# Patient Record
Sex: MALE | Race: BLACK OR AFRICAN AMERICAN | NOT HISPANIC OR LATINO | Employment: FULL TIME | URBAN - METROPOLITAN AREA
[De-identification: names, ages, dates, MRNs, and addresses within clinical notes are randomized per-mention and may not be internally consistent; named-entity substitution may affect disease eponyms.]

---

## 2023-04-28 ENCOUNTER — OFFICE VISIT (OUTPATIENT)
Dept: FAMILY MEDICINE | Facility: CLINIC | Age: 38
End: 2023-04-28
Payer: COMMERCIAL

## 2023-04-28 VITALS
BODY MASS INDEX: 39.57 KG/M2 | WEIGHT: 261.1 LBS | DIASTOLIC BLOOD PRESSURE: 83 MMHG | HEART RATE: 78 BPM | OXYGEN SATURATION: 99 % | HEIGHT: 68 IN | RESPIRATION RATE: 20 BRPM | SYSTOLIC BLOOD PRESSURE: 129 MMHG | TEMPERATURE: 98.2 F

## 2023-04-28 DIAGNOSIS — K21.00 GASTROESOPHAGEAL REFLUX DISEASE WITH ESOPHAGITIS, UNSPECIFIED WHETHER HEMORRHAGE: ICD-10-CM

## 2023-04-28 DIAGNOSIS — R07.89 ATYPICAL CHEST PAIN: ICD-10-CM

## 2023-04-28 DIAGNOSIS — Z00.00 ROUTINE HISTORY AND PHYSICAL EXAMINATION OF ADULT: Primary | ICD-10-CM

## 2023-04-28 DIAGNOSIS — R10.13 ABDOMINAL PAIN, EPIGASTRIC: ICD-10-CM

## 2023-04-28 DIAGNOSIS — R06.83 SNORING: ICD-10-CM

## 2023-04-28 DIAGNOSIS — K29.50 CHRONIC GASTRITIS WITHOUT BLEEDING, UNSPECIFIED GASTRITIS TYPE: ICD-10-CM

## 2023-04-28 DIAGNOSIS — Z13.1 SCREENING FOR DIABETES MELLITUS: ICD-10-CM

## 2023-04-28 DIAGNOSIS — Z13.220 LIPID SCREENING: ICD-10-CM

## 2023-04-28 LAB
ALBUMIN SERPL BCG-MCNC: 4.7 G/DL (ref 3.5–5.2)
ALP SERPL-CCNC: 74 U/L (ref 40–129)
ALT SERPL W P-5'-P-CCNC: 38 U/L (ref 10–50)
ANION GAP SERPL CALCULATED.3IONS-SCNC: 11 MMOL/L (ref 7–15)
AST SERPL W P-5'-P-CCNC: 30 U/L (ref 10–50)
BILIRUB SERPL-MCNC: 0.3 MG/DL
BUN SERPL-MCNC: 9 MG/DL (ref 6–20)
CALCIUM SERPL-MCNC: 9.5 MG/DL (ref 8.6–10)
CHLORIDE SERPL-SCNC: 105 MMOL/L (ref 98–107)
CHOLEST SERPL-MCNC: 166 MG/DL
CREAT SERPL-MCNC: 1.02 MG/DL (ref 0.67–1.17)
DEPRECATED HCO3 PLAS-SCNC: 23 MMOL/L (ref 22–29)
ERYTHROCYTE [DISTWIDTH] IN BLOOD BY AUTOMATED COUNT: 13 % (ref 10–15)
GFR SERPL CREATININE-BSD FRML MDRD: >90 ML/MIN/1.73M2
GLUCOSE SERPL-MCNC: 105 MG/DL (ref 70–99)
HBA1C MFR BLD: 5.4 % (ref 0–5.6)
HCT VFR BLD AUTO: 47.1 % (ref 40–53)
HDLC SERPL-MCNC: 37 MG/DL
HGB BLD-MCNC: 16.5 G/DL (ref 13.3–17.7)
LDLC SERPL CALC-MCNC: 96 MG/DL
LIPASE SERPL-CCNC: 44 U/L (ref 13–60)
MCH RBC QN AUTO: 28.2 PG (ref 26.5–33)
MCHC RBC AUTO-ENTMCNC: 35 G/DL (ref 31.5–36.5)
MCV RBC AUTO: 80 FL (ref 78–100)
NONHDLC SERPL-MCNC: 129 MG/DL
PLATELET # BLD AUTO: 207 10E3/UL (ref 150–450)
POTASSIUM SERPL-SCNC: 4 MMOL/L (ref 3.4–5.3)
PROT SERPL-MCNC: 7.6 G/DL (ref 6.4–8.3)
RBC # BLD AUTO: 5.86 10E6/UL (ref 4.4–5.9)
SODIUM SERPL-SCNC: 139 MMOL/L (ref 136–145)
TRIGL SERPL-MCNC: 164 MG/DL
TROPONIN T SERPL HS-MCNC: 8 NG/L
WBC # BLD AUTO: 5.8 10E3/UL (ref 4–11)

## 2023-04-28 PROCEDURE — 83036 HEMOGLOBIN GLYCOSYLATED A1C: CPT | Performed by: INTERNAL MEDICINE

## 2023-04-28 PROCEDURE — 36415 COLL VENOUS BLD VENIPUNCTURE: CPT | Performed by: INTERNAL MEDICINE

## 2023-04-28 PROCEDURE — 80053 COMPREHEN METABOLIC PANEL: CPT | Performed by: INTERNAL MEDICINE

## 2023-04-28 PROCEDURE — 99385 PREV VISIT NEW AGE 18-39: CPT | Performed by: INTERNAL MEDICINE

## 2023-04-28 PROCEDURE — 93000 ELECTROCARDIOGRAM COMPLETE: CPT | Performed by: INTERNAL MEDICINE

## 2023-04-28 PROCEDURE — 83690 ASSAY OF LIPASE: CPT | Performed by: INTERNAL MEDICINE

## 2023-04-28 PROCEDURE — 85027 COMPLETE CBC AUTOMATED: CPT | Performed by: INTERNAL MEDICINE

## 2023-04-28 PROCEDURE — 80061 LIPID PANEL: CPT | Performed by: INTERNAL MEDICINE

## 2023-04-28 PROCEDURE — 99214 OFFICE O/P EST MOD 30 MIN: CPT | Mod: 25 | Performed by: INTERNAL MEDICINE

## 2023-04-28 PROCEDURE — 84484 ASSAY OF TROPONIN QUANT: CPT | Performed by: INTERNAL MEDICINE

## 2023-04-28 RX ORDER — PANTOPRAZOLE SODIUM 40 MG/1
40 TABLET, DELAYED RELEASE ORAL DAILY
Qty: 30 TABLET | Refills: 1 | Status: SHIPPED | OUTPATIENT
Start: 2023-04-28 | End: 2023-07-05

## 2023-04-28 ASSESSMENT — ENCOUNTER SYMPTOMS: ABDOMINAL PAIN: 1

## 2023-04-28 ASSESSMENT — PAIN SCALES - GENERAL: PAINLEVEL: NO PAIN (0)

## 2023-04-28 NOTE — PROGRESS NOTES
SUBJECTIVE:   CC: Rory is an 37 year old who presents for preventative health visit.     Patient has been advised of split billing requirements and indicates understanding: Yes  Healthy Habits:     Getting at least 3 servings of Calcium per day:  Yes    Bi-annual eye exam:  NO    Dental care twice a year:  NO    Sleep apnea or symptoms of sleep apnea:  None    Diet:  Regular (no restrictions)    Frequency of exercise:  1 day/week    Duration of exercise:  30-45 minutes    Taking medications regularly:  Yes    Medication side effects:  None    PHQ-2 Total Score: 0    Additional concerns today:  Yes    Ability to successfully perform activities of daily living: Yes, no assistance needed  Home safety:  none identified   Hearing impairment: NONE       HAD MALARIA BEFORE HE CAME TO [facility] 10 MONTHS    UTD ON VACCINES    ABDOMINAL PAIN/ CHEST PAIN, CRAMPS  LIKE SX'S NOT RELATED TO EXERTION, NO WT LIFTING,     NO N,V,D    HAS HEART BURN    Today's PHQ-2 Score:       4/28/2023    12:31 PM   PHQ-2 ( 1999 [employer])   Q1: Little interest or pleasure in doing things 0   Q2: Feeling down, depressed or hopeless 0   PHQ-2 Score 0   Q1: Little interest or pleasure in doing things Not at all    Not at all   Q2: Feeling down, depressed or hopeless Not at all    Not at all   PHQ-2 Score 0    0           Social History     Tobacco Use     Smoking status: Never     Smokeless tobacco: Never   Vaping Use     Vaping status: Never Used   Substance Use Topics     Alcohol use: Never             4/28/2023    12:31 PM   Alcohol Use   Prescreen: >3 drinks/day or >7 drinks/week? No          View : No data to display.                Last PSA: No results found for: PSA    Reviewed orders with patient. Reviewed health maintenance and updated orders accordingly - Yes  Lab work is in process  Labs reviewed in EPIC  BP Readings from Last 3 Encounters:   04/28/23 129/83    Wt Readings from Last 3 Encounters:   04/28/23 118.4 kg (261 lb 1.6 oz)                   Patient Active Problem List   Diagnosis     Snoring     History reviewed. No pertinent surgical history.    Social History     Tobacco Use     Smoking status: Never     Smokeless tobacco: Never   Vaping Use     Vaping status: Never Used   Substance Use Topics     Alcohol use: Never     History reviewed. No pertinent family history.      Current Outpatient Medications   Medication Sig Dispense Refill     pantoprazole (PROTONIX) 40 MG EC tablet Take 1 tablet (40 mg) by mouth daily 30 tablet 1     No Known Allergies  Recent Labs   Lab Test 04/28/23  1405   A1C 5.4        Reviewed and updated as needed this visit by clinical staff   Tobacco  Allergies  Meds  Problems  Med Hx  Surg Hx  Fam Hx          Reviewed and updated as needed this visit by Provider   Tobacco  Allergies   Problems  Med Hx  Surg Hx  Fam Hx         History reviewed. No pertinent past medical history.   History reviewed. No pertinent surgical history.    Review of Systems  CONSTITUTIONAL: NEGATIVE for fever, chills, change in weight  INTEGUMENTARY/SKIN: NEGATIVE for worrisome rashes, moles or lesions  EYES: NEGATIVE for vision changes or irritation  ENT: NEGATIVE for ear, mouth and throat problems, OCCASIONAL SNORING  RESP: NEGATIVE for significant cough or SOB  CV: NEGATIVE for chest pain, palpitations or peripheral edema  GI: NEGATIVE for nausea, abdominal pain, heartburn, or change in bowel habits   male: negative for dysuria, hematuria, decreased urinary stream, erectile dysfunction, urethral discharge  MUSCULOSKELETAL: NEGATIVE for significant arthralgias or myalgia, SOMETIMES LOW BACK PAINS  NEURO: NEGATIVE for weakness, dizziness or paresthesias  ENDOCRINE: NEGATIVE for temperature intolerance, skin/hair changes  PSYCHIATRIC: NEGATIVE for changes in mood or affect    OBJECTIVE:   /83 (BP Location: Left arm, Patient Position: Sitting, Cuff Size: Adult Large)   Pulse 78   Temp 98.2  F (36.8  C) (Oral)   Resp 20    "Ht 1.73 m (5' 8.11\")   Wt 118.4 kg (261 lb 1.6 oz)   SpO2 99%   BMI 39.57 kg/m      Physical Exam  GENERAL: healthy, alert and no distress  EYES: Eyes grossly normal to inspection, PERRL and conjunctivae and sclerae normal  HENT: ear canals and TM's normal, nose and mouth without ulcers or lesions  NECK: no adenopathy, no asymmetry, masses, or scars and thyroid normal to palpation  RESP: lungs clear to auscultation - no rales, rhonchi or wheezes  CV: regular rate and rhythm, normal S1 S2, no S3 or S4, no murmur, click or rub, no peripheral edema and peripheral pulses strong  ABDOMEN: soft, nontender, no hepatosplenomegaly, no masses and bowel sounds normal  MS: no gross musculoskeletal defects noted, no edema  SKIN: no suspicious lesions or rashes  NEURO: Normal strength and tone, mentation intact and speech normal  PSYCH: mentation appears normal, affect normal/bright    Diagnostic Test Results:  Labs reviewed in Epic    ASSESSMENT/PLAN:   Rory was seen today for physical.    Diagnoses and all orders for this visit:    Routine history and physical examination of adult  -     CBC with platelets  -     Comprehensive metabolic panel (BMP + Alb, Alk Phos, ALT, AST, Total. Bili, TP)    Snoring  -     Adult Sleep Eval & Management  Referral; Future    Lipid screening  -     Lipid panel reflex to direct LDL Fasting    Screening for diabetes mellitus  -     Hemoglobin A1c    Gastroesophageal reflux disease with esophagitis, unspecified whether hemorrhage  -     Helicobacter pylori Antigen Stool; Future  -     pantoprazole (PROTONIX) 40 MG EC tablet; Take 1 tablet (40 mg) by mouth daily  -     Helicobacter pylori Antigen Stool    Atypical chest pain  -     EKG 12-lead complete w/read - Clinics  -     Troponin T, High Sensitivity    Abdominal pain, epigastric  -     Lipase  -     US Abdomen Limited; Future    Chronic gastritis without bleeding, unspecified gastritis type  -     pantoprazole (PROTONIX) 40 MG EC " tablet; Take 1 tablet (40 mg) by mouth daily    Patient complaining of abdominal pain and chest pain.  Possible GERD related symptoms, he does have history of heartburn.  Denies any melena or hematochezia.  Denies any breathing issues or pleuritic chest pain.  Denies any chest pain with exertion.  We will do an EKG troponin level start him on PPI therapy for 4 weeks and follow-up in 4 weeks.  Consider stress test if his symptoms continue.  His abdominal exam is benign.  Currently has no chest pain symptoms.  Check some screening labs.  He reports he received all his vaccines when he applied to the [facility] in [facility].  Denies any history of TB.  He was treated for malaria with atovaquone prior to his trip to the [place].  Describes some snoring not sure he has any apneas.  We will refer to sleep medicine which she is interested.  Blood pressure slightly elevated.  Will monitor.    Patient has been advised of split billing requirements and indicates understanding: Yes      COUNSELING:   Reviewed preventive health counseling, as reflected in patient instructions       Regular exercise       Healthy diet/nutrition       Vision screening       Hearing screening       Alcohol Use        Family planning       Safe sex practices/STD prevention       Consider Hep C screening for all patients one time for ages 18-79 years       HIV screeninx in teen years, 1x in adult years, and at intervals if high risk       Advance Care Planning        He reports that he has never smoked. He has never used smokeless tobacco.            Aron Ramos MD  [facility]

## 2023-04-29 NOTE — RESULT ENCOUNTER NOTE
Please notify patient of the following lab results,    Protus, reviewed some of your labs and EKG,    EKG is normal.    CBC shows normal blood counts including normal hemoglobin.  There is no anemia and normal platelet count.    Test for diabetes called HbA1c is normal at 5.4, you do not have prediabetes or diabetes.    Please reassure with the lab results,     Rest of labs are pending  Dr Ramos

## 2023-04-29 NOTE — RESULT ENCOUNTER NOTE
Danis Valadez, it was very nice meeting you in the clinic, reviewed your labs,    Chemistry shows normal electrolytes, normal kidney function test, normal liver enzymes, normal calcium level ,.    Troponin  is cardiac enzyme was normal which is reassuring, suggest there is no cardiac muscle injury to account for atypical chest pain.    Pancreatic test called lipase is normal,     test for diabetes called HbA1c is normal, you do not have prediabetes or diabetes, HbA1c is 5.4,    Lipid panel shows normal LDL [bad cholesterol], HDL( HDL Good cholesterol),  slightly low at 37; goal greater than 50; can increase HDL by increase exercise activities and weight loss as tolerated, triglycerides slightly elevated,    Continue with healthy diet and brisk walking 30 minutes 5 times a week,      The ASCVD Risk score (Ro DK, et al., 2019) failed to calculate for the following reasons:    The 2019 ASCVD risk score is only valid for ages 40 to 79     CBC shows normal blood counts including normal white blood cell count, normal hemoglobin and hematocrit, no anemia and normal platelet count,    Any further questions please let me know    Dr Ramos

## 2023-05-01 ENCOUNTER — TELEPHONE (OUTPATIENT)
Dept: FAMILY MEDICINE | Facility: CLINIC | Age: 38
End: 2023-05-01
Payer: COMMERCIAL

## 2023-05-01 NOTE — TELEPHONE ENCOUNTER
Patient Contact    Attempt # 1    Was call answered?  No.  Left message on voicemail with information to call triage back.    On callback - please review Dr. Ramos's result note with patient (2 separate result notes available from 4/28/23)     Geovanny Alexis RN  [facility]

## 2023-05-01 NOTE — TELEPHONE ENCOUNTER
----- Message from Aron Ramos MD sent at 4/29/2023  8:49 AM CDT -----  Hi Martinus, it was very nice meeting you in the clinic, reviewed your labs,    Chemistry shows normal electrolytes, normal kidney function test, normal liver enzymes, normal calcium level ,.    Troponin  is cardiac enzyme was normal which is reassuring, suggest there is no cardiac muscle injury to account for atypical chest pain.    Pancreatic test called lipase is normal,     test for diabetes called HbA1c is normal, you do not have prediabetes or diabetes, HbA1c is 5.4,    Lipid panel shows normal LDL [bad cholesterol], HDL( HDL Good cholesterol),  slightly low at 37; goal greater than 50; can increase HDL by increase exercise activities and weight loss as tolerated, triglycerides slightly elevated,    Continue with healthy diet and brisk walking 30 minutes 5 times a week,      The ASCVD Risk score (Ro DK, et al., 2019) failed to calculate for the following reasons:    The 2019 ASCVD risk score is only valid for ages 40 to 79     CBC shows normal blood counts including normal white blood cell count, normal hemoglobin and hematocrit, no anemia and normal platelet count,    Any further questions please let me know    Dr Ramos

## 2023-05-02 NOTE — TELEPHONE ENCOUNTER
Called patient regarding PCP message below. Patient verbalized understanding.     Angelika Keating RN on 5/2/2023 at 9:57 AM

## 2023-05-04 ENCOUNTER — TELEPHONE (OUTPATIENT)
Dept: FAMILY MEDICINE | Facility: CLINIC | Age: 38
End: 2023-05-04
Payer: COMMERCIAL

## 2023-05-04 NOTE — TELEPHONE ENCOUNTER
Called pt, spoke about cholesterol levels and reviewed diet and exercise recommendations for high cholesterol. Pt receptive to recommendation.     FRANCISCO MTZ RN on 5/4/2023 at 1:58 PM

## 2023-05-04 NOTE — TELEPHONE ENCOUNTER
Test Results    Contacts       Type Contact Phone/Fax    05/04/2023 09:17 AM CDT Phone (Incoming) Rory Melendrez N (Self) [phone] (H)          Who ordered the test:  Aron Ramos MD    Type of test: Lab    Date of test:  4/28/2023    Where was the test performed:  [facility]    What are your questions/concerns?:  Patient requesting to go over lab results. He has a particular concern regarding cholesterol reading.     Could we send this information to you in [facility] or would you prefer to receive a phone call?:   Patient would prefer a phone call   Okay to leave a detailed message?: Yes at Home number on file [phone] (home)

## 2023-05-19 ENCOUNTER — OFFICE VISIT (OUTPATIENT)
Dept: FAMILY MEDICINE | Facility: CLINIC | Age: 38
End: 2023-05-19
Payer: COMMERCIAL

## 2023-05-19 VITALS
WEIGHT: 257.9 LBS | HEIGHT: 68 IN | OXYGEN SATURATION: 100 % | TEMPERATURE: 98.2 F | SYSTOLIC BLOOD PRESSURE: 125 MMHG | DIASTOLIC BLOOD PRESSURE: 78 MMHG | BODY MASS INDEX: 39.09 KG/M2 | HEART RATE: 77 BPM | RESPIRATION RATE: 22 BRPM

## 2023-05-19 DIAGNOSIS — R07.9 CHEST PAIN, UNSPECIFIED TYPE: ICD-10-CM

## 2023-05-19 DIAGNOSIS — R10.84 ABDOMINAL PAIN, GENERALIZED: Primary | ICD-10-CM

## 2023-05-19 DIAGNOSIS — R06.83 SNORING: ICD-10-CM

## 2023-05-19 DIAGNOSIS — E66.812 CLASS 2 OBESITY WITHOUT SERIOUS COMORBIDITY WITH BODY MASS INDEX (BMI) OF 39.0 TO 39.9 IN ADULT, UNSPECIFIED OBESITY TYPE: ICD-10-CM

## 2023-05-19 PROCEDURE — 99214 OFFICE O/P EST MOD 30 MIN: CPT | Performed by: INTERNAL MEDICINE

## 2023-05-19 PROCEDURE — 87338 HPYLORI STOOL AG IA: CPT | Performed by: INTERNAL MEDICINE

## 2023-05-19 ASSESSMENT — PAIN SCALES - GENERAL: PAINLEVEL: MODERATE PAIN (5)

## 2023-05-19 NOTE — PROGRESS NOTES
"  Assessment & Plan   Problem List Items Addressed This Visit        Respiratory    Snoring   Other Visit Diagnoses     Abdominal pain, generalized    -  Primary    Relevant Orders    Cryptosporidium/Giardia Immunoassay    Adult GI  Referral - Consult Only    US Abdomen Complete    Chest pain, unspecified type        Relevant Orders    Exercise Stress Test - Adult    Class 2 obesity without serious comorbidity with body mass index (BMI) of 39.0 to 39.9 in adult, unspecified obesity type               I am not sure about the cause of his symptoms- atypical chest pain atypical abdominal symptoms in the periumbilical area.  Describes being a bit gassy.  Advised can take probiotics, we will do an ultrasound of the abdomen and see GI for a consult can check  Giardia he does not have diarrhea.  [place]  He is getting tested today for H. Pylori; he submitted a stool sample.  As for the atypical chest symptoms, we will do a stress exercise test if that is negative probably his symptoms are related to GERD ,continue on pantoprazole, he had some improvement in his chest symptoms with starting a PPI.  Also patient has history of snoring he is not aware of any apnea or feeling tired or sleepy during the day.  Continue closely monitor.  Repeat blood pressure was within normal.  Strongly encouraged on exercise and weight loss at this point.We may refer him to [facility] To discuss  obesity medications including GLP-1 agonist injections.       BMI:   Estimated body mass index is 39.09 kg/m  as calculated from the following:    Height as of this encounter: 1.73 m (5' 8.11\").    Weight as of this encounter: 117 kg (257 lb 14.4 oz).   Weight management plan: Discussed healthy diet and exercise guidelines    CONSULTATION/REFERRAL to GI  Work on weight loss  Regular exercise  See Patient Instructions    Aron Ramos MD  [facility] KORTNEY Valadez is a 37 year old, presenting for " "the following health issues:  Follow Up (3 Week Follow up, having chest and abdomen pain,5 out of 10 pain score)    History of Present Illness       Hyperlipidemia:  He presents for follow up of hyperlipidemia.  He is not taking medication to lower cholesterol. He is not having myalgia or other side effects to statin medications.    He eats 0-1 servings of fruits and vegetables daily.He consumes 2 sweetened beverage(s) daily.He exercises with enough effort to increase his heart rate 9 or less minutes per day.  He exercises with enough effort to increase his heart rate 3 or less days per week.   He is taking medications regularly.     Has been on pantoprazole had showed some relief is still have some chest pain and abdominal pain.  His lab work-up was nonrevealing  except for slightly low HDL lipase was normal.  He does not exercise.  Repeat blood pressure was normal 125/78.  Patient does have history of snoring but not loud he does not feel sleepy during the day and he wakes up refreshed.      No diarrhea or constipation.  He feels heartburn all around the chest, the pantoprazole did help with that.  Has pain in the periumbilical area he feels gassy at times.  No blood in his stool regular bowel movements.      No family history of colon cancer.  No blood in the stools.  No diarrhea or constipation.  No postprandial pain.  No particular food triggers the pain pain is intermittent pain can be up to 5 out of 10.  Feels like stabbing pain or stiff at times.    Review of Systems   Constitutional, HEENT, cardiovascular, pulmonary, gi and gu systems are negative, except as otherwise noted.      Objective    /78   Pulse 77   Temp 98.2  F (36.8  C) (Oral)   Resp 22   Ht 1.73 m (5' 8.11\")   Wt 117 kg (257 lb 14.4 oz)   SpO2 100%   BMI 39.09 kg/m    Body mass index is 39.09 kg/m .  Physical Exam   GENERAL: healthy, alert and no distress  EYES: Eyes grossly normal to inspection, PERRL and conjunctivae and sclerae " normal  HENT: ear canals and TM's normal, nose and mouth without ulcers or lesions  NECK: no adenopathy, no asymmetry, masses, or scars and thyroid normal to palpation  RESP: lungs clear to auscultation - no rales, rhonchi or wheezes  CV: regular rate and rhythm, normal S1 S2, no S3 or S4, no murmur, click or rub, no peripheral edema and peripheral pulses strong  ABDOMEN: soft, nontender, no hepatosplenomegaly, no masses and bowel sounds normal  MS: no gross musculoskeletal defects noted, no edema  SKIN: no suspicious lesions or rashes  NEURO: Normal strength and tone, mentation intact and speech normal  PSYCH: mentation appears normal, affect normal/bright    Office Visit on 04/28/2023   Component Date Value Ref Range Status     Cholesterol 04/28/2023 166  <200 mg/dL Final     Triglycerides 04/28/2023 164 (H)  <150 mg/dL Final     Direct Measure HDL 04/28/2023 37 (L)  >=40 mg/dL Final     LDL Cholesterol Calculated 04/28/2023 96  <=100 mg/dL Final     Non HDL Cholesterol 04/28/2023 129  <130 mg/dL Final     WBC Count 04/28/2023 5.8  4.0 - 11.0 10e3/uL Final     RBC Count 04/28/2023 5.86  4.40 - 5.90 10e6/uL Final     Hemoglobin 04/28/2023 16.5  13.3 - 17.7 g/dL Final     Hematocrit 04/28/2023 47.1  40.0 - 53.0 % Final     MCV 04/28/2023 80  78 - 100 fL Final     MCH 04/28/2023 28.2  26.5 - 33.0 pg Final     MCHC 04/28/2023 35.0  31.5 - 36.5 g/dL Final     RDW 04/28/2023 13.0  10.0 - 15.0 % Final     Platelet Count 04/28/2023 207  150 - 450 10e3/uL Final     Sodium 04/28/2023 139  136 - 145 mmol/L Final     Potassium 04/28/2023 4.0  3.4 - 5.3 mmol/L Final     Chloride 04/28/2023 105  98 - 107 mmol/L Final     Carbon Dioxide (CO2) 04/28/2023 23  22 - 29 mmol/L Final     Anion Gap 04/28/2023 11  7 - 15 mmol/L Final     Urea Nitrogen 04/28/2023 9.0  6.0 - 20.0 mg/dL Final     Creatinine 04/28/2023 1.02  0.67 - 1.17 mg/dL Final     Calcium 04/28/2023 9.5  8.6 - 10.0 mg/dL Final     Glucose 04/28/2023 105 (H)  70 - 99  mg/dL Final     Alkaline Phosphatase 04/28/2023 74  40 - 129 U/L Final     AST 04/28/2023 30  10 - 50 U/L Final     ALT 04/28/2023 38  10 - 50 U/L Final     Protein Total 04/28/2023 7.6  6.4 - 8.3 g/dL Final     Albumin 04/28/2023 4.7  3.5 - 5.2 g/dL Final     Bilirubin Total 04/28/2023 0.3  <=1.2 mg/dL Final     GFR Estimate 04/28/2023 >90  >60 mL/min/1.73m2 Final    eGFR calculated using 2021 CKD-EPI equation.     Hemoglobin A1C 04/28/2023 5.4  0.0 - 5.6 % Final    Normal <5.7%   Prediabetes 5.7-6.4%    Diabetes 6.5% or higher     Note: Adopted from ADA consensus guidelines.     Troponin T, High Sensitivity 04/28/2023 8  <=22 ng/L Final    Either a High Sensitivity Troponin T baseline (0 hours) value = 100 ng/L, or an increase in High Sensitivity Troponin T = 7 ng/L at 2 hours compared to 0 hours (2-0 hours), suggests myocardial injury, and urgent clinical attention is required.    If the 2-0 hours increase is <7 ng/L, a High Sensitivity Troponin T result above gender-specific reference ranges warrants further evaluation.   Recommendations for further evaluation include correlation with clinical decision-making tool (e.g., HEART), a 3rd High Sensitivity Troponin T test 2 hours after the 2nd (a 20% change from baseline would represent concern), admission for observation, close PCC/cardiology follow-up, or urgent outpatient provocative testing.     Lipase 04/28/2023 44  13 - 60 U/L Final

## 2023-05-21 ENCOUNTER — HEALTH MAINTENANCE LETTER (OUTPATIENT)
Age: 38
End: 2023-05-21

## 2023-05-22 ENCOUNTER — TELEPHONE (OUTPATIENT)
Dept: GASTROENTEROLOGY | Facility: CLINIC | Age: 38
End: 2023-05-22
Payer: COMMERCIAL

## 2023-05-22 LAB — H PYLORI AG STL QL IA: NEGATIVE

## 2023-05-22 NOTE — TELEPHONE ENCOUNTER
Health Call Center    Phone Message    May a detailed message be left on voicemail: yes     Reason for Call: Appointment Intake    Referring Provider Name:   Diagnosis and/or Symptoms: Abdominal pain, generalized [R10.84]    Per triage notes, patient is to be seen as a GI Urgent, but is currently scheduled on 07/18/2023 with Dr. Pallavi Staley. Current appointment is outside requested time frame. Please review for further follow up per scheduling guidelines. Thanks!     Action Taken: Message routed to:  [facility] ([facility] GI    Travel Screening: Not Applicable

## 2023-05-25 ENCOUNTER — ANCILLARY PROCEDURE (OUTPATIENT)
Dept: ULTRASOUND IMAGING | Facility: CLINIC | Age: 38
End: 2023-05-25
Attending: INTERNAL MEDICINE
Payer: COMMERCIAL

## 2023-05-25 DIAGNOSIS — R10.84 ABDOMINAL PAIN, GENERALIZED: ICD-10-CM

## 2023-05-25 PROCEDURE — 76700 US EXAM ABDOM COMPLETE: CPT

## 2023-06-02 NOTE — TELEPHONE ENCOUNTER
Called Pt to help get him scheduled for a sooner appointment. Pt picked up the phone and during Writer's introduction, the phone line got disconnected. Writer tried calling back a few times and it went straight to voicemail.     Writer will send Pt a [employer] message.

## 2023-06-09 NOTE — TELEPHONE ENCOUNTER
Pt called and talked with Pt. Pt is now rescheduled to see Dr. Rick Townsend on 6/13/2023 at 8:40am for an in-person clinic visit.

## 2023-06-09 NOTE — TELEPHONE ENCOUNTER
REFERRAL INFORMATION:  Referring Provider:  Dr. Aron Ramos MD  Referring Clinic:  [facility]  Reason for Visit/Diagnosis: Abdominal pain     FUTURE VISIT INFORMATION:  Appointment Date: 6/13/2023  Appointment Time: 8:40 AM     NOTES STATUS DETAILS   OFFICE NOTE from Referring Provider [facility]:  5/19/23- OV with Dr. Aron Ramos MD for abdominal pain  4/28/23- OV with Dr. Aron Ramos MD for routine physical exam   OFFICE NOTE from Other Specialist N/A    HOSPITAL DISCHARGE SUMMARY/  ED VISITS N/A    OPERATIVE REPORT N/A    MEDICATION LIST Internal         ENDOSCOPY  N/A    COLONOSCOPY N/A    ERCP N/A    EUS N/A      STOOL TESTING N/A    PERTINENT LABS Internal    PATHOLOGY REPORTS (RELATED) N/A    IMAGING (CT, MRI, EGD, MRCP, Small Bowel Follow Through/SBT, MR/CT Enterography) [facility]:   5/25/23- US abdomen complete

## 2023-06-13 ENCOUNTER — PRE VISIT (OUTPATIENT)
Dept: GASTROENTEROLOGY | Facility: CLINIC | Age: 38
End: 2023-06-13

## 2023-06-16 ENCOUNTER — OFFICE VISIT (OUTPATIENT)
Dept: FAMILY MEDICINE | Facility: CLINIC | Age: 38
End: 2023-06-16
Payer: COMMERCIAL

## 2023-06-16 VITALS
DIASTOLIC BLOOD PRESSURE: 74 MMHG | SYSTOLIC BLOOD PRESSURE: 121 MMHG | HEART RATE: 77 BPM | WEIGHT: 258.3 LBS | OXYGEN SATURATION: 98 % | RESPIRATION RATE: 20 BRPM | HEIGHT: 68 IN | TEMPERATURE: 98.2 F | BODY MASS INDEX: 39.15 KG/M2

## 2023-06-16 DIAGNOSIS — R07.89 ATYPICAL CHEST PAIN: ICD-10-CM

## 2023-06-16 DIAGNOSIS — R06.83 SNORING: ICD-10-CM

## 2023-06-16 DIAGNOSIS — E66.9 OBESITY (BMI 35.0-39.9 WITHOUT COMORBIDITY): ICD-10-CM

## 2023-06-16 DIAGNOSIS — R10.9 ABDOMINAL DISCOMFORT: Primary | ICD-10-CM

## 2023-06-16 PROCEDURE — 99213 OFFICE O/P EST LOW 20 MIN: CPT | Mod: 25 | Performed by: INTERNAL MEDICINE

## 2023-06-16 PROCEDURE — 90715 TDAP VACCINE 7 YRS/> IM: CPT | Performed by: INTERNAL MEDICINE

## 2023-06-16 PROCEDURE — 87328 CRYPTOSPORIDIUM AG IA: CPT | Performed by: INTERNAL MEDICINE

## 2023-06-16 PROCEDURE — 87329 GIARDIA AG IA: CPT | Performed by: INTERNAL MEDICINE

## 2023-06-16 PROCEDURE — 90471 IMMUNIZATION ADMIN: CPT | Performed by: INTERNAL MEDICINE

## 2023-06-16 ASSESSMENT — PAIN SCALES - GENERAL: PAINLEVEL: MODERATE PAIN (4)

## 2023-06-16 NOTE — PROGRESS NOTES
"  Assessment & Plan   Problem List Items Addressed This Visit        Respiratory    Snoring   Other Visit Diagnoses     Abdominal discomfort    -  Primary    Atypical chest pain        Obesity (BMI 35.0-39.9 without comorbidity)             Stressed importance of weight loss and exercise activities.  Follow-up with GI.  He will need further evaluation by sleep medicine as well.  Blood pressure repeat was at goal.  No need for repeat labs today.  He has stool for ova and parasite, he will give stool sample to the lab today.  All questions answered.  He missed appointment with GI on 6/13 I gave him the phone number to call and make another appointment.  Follow-up in 2 months and as needed.             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  Has nonspecific abdominal GI concerns.  He is worried that he was treated for typhoid back home.  He denies any other GI symptoms.  Feels the PPI pantoprazole is helping him.  He is getting cardiac stress test today for nonspecific atypical chest pain symptoms.  This seems to have improved when taking PPI therapy probably related to GERD..        Review of Systems   Constitutional, HEENT, cardiovascular, pulmonary, gi and gu systems are negative, except as otherwise noted.      Objective    /74 (BP Location: Right arm, Patient Position: Sitting, Cuff Size: Adult Large)   Pulse 77   Temp 98.2  F (36.8  C) (Oral)   Resp 20   Ht 1.73 m (5' 8.11\")   Wt 117.2 kg (258 lb 4.8 oz)   SpO2 98%   BMI 39.15 kg/m    Body mass index is 39.15 kg/m .  Physical Exam   GENERAL: healthy, alert and no distress  EYES: Eyes grossly normal to inspection, PERRL and conjunctivae and sclerae normal  HENT: ear canals and TM's normal, nose and mouth without ulcers or lesions  NECK: no adenopathy, no asymmetry, masses, or scars and thyroid normal " to palpation  RESP: lungs clear to auscultation - no rales, rhonchi or wheezes  CV: regular rate and rhythm, normal S1 S2, no S3 or S4, no murmur, click or rub, no peripheral edema and peripheral pulses strong  ABDOMEN: soft, nontender, no hepatosplenomegaly, no masses and bowel sounds normal  MS: no gross musculoskeletal defects noted, no edema  SKIN: no suspicious lesions or rashes  NEURO: Normal strength and tone, mentation intact and speech normal  PSYCH: mentation appears normal, affect normal/bright    Office Visit on 04/28/2023   Component Date Value Ref Range Status     Cholesterol 04/28/2023 166  <200 mg/dL Final     Triglycerides 04/28/2023 164 (H)  <150 mg/dL Final     Direct Measure HDL 04/28/2023 37 (L)  >=40 mg/dL Final     LDL Cholesterol Calculated 04/28/2023 96  <=100 mg/dL Final     Non HDL Cholesterol 04/28/2023 129  <130 mg/dL Final     WBC Count 04/28/2023 5.8  4.0 - 11.0 10e3/uL Final     RBC Count 04/28/2023 5.86  4.40 - 5.90 10e6/uL Final     Hemoglobin 04/28/2023 16.5  13.3 - 17.7 g/dL Final     Hematocrit 04/28/2023 47.1  40.0 - 53.0 % Final     MCV 04/28/2023 80  78 - 100 fL Final     MCH 04/28/2023 28.2  26.5 - 33.0 pg Final     MCHC 04/28/2023 35.0  31.5 - 36.5 g/dL Final     RDW 04/28/2023 13.0  10.0 - 15.0 % Final     Platelet Count 04/28/2023 207  150 - 450 10e3/uL Final     Sodium 04/28/2023 139  136 - 145 mmol/L Final     Potassium 04/28/2023 4.0  3.4 - 5.3 mmol/L Final     Chloride 04/28/2023 105  98 - 107 mmol/L Final     Carbon Dioxide (CO2) 04/28/2023 23  22 - 29 mmol/L Final     Anion Gap 04/28/2023 11  7 - 15 mmol/L Final     Urea Nitrogen 04/28/2023 9.0  6.0 - 20.0 mg/dL Final     Creatinine 04/28/2023 1.02  0.67 - 1.17 mg/dL Final     Calcium 04/28/2023 9.5  8.6 - 10.0 mg/dL Final     Glucose 04/28/2023 105 (H)  70 - 99 mg/dL Final     Alkaline Phosphatase 04/28/2023 74  40 - 129 U/L Final     AST 04/28/2023 30  10 - 50 U/L Final     ALT 04/28/2023 38  10 - 50 U/L Final      Protein Total 04/28/2023 7.6  6.4 - 8.3 g/dL Final     Albumin 04/28/2023 4.7  3.5 - 5.2 g/dL Final     Bilirubin Total 04/28/2023 0.3  <=1.2 mg/dL Final     GFR Estimate 04/28/2023 >90  >60 mL/min/1.73m2 Final    eGFR calculated using 2021 CKD-EPI equation.     Hemoglobin A1C 04/28/2023 5.4  0.0 - 5.6 % Final    Normal <5.7%   Prediabetes 5.7-6.4%    Diabetes 6.5% or higher     Note: Adopted from ADA consensus guidelines.     Troponin T, High Sensitivity 04/28/2023 8  <=22 ng/L Final    Either a High Sensitivity Troponin T baseline (0 hours) value = 100 ng/L, or an increase in High Sensitivity Troponin T = 7 ng/L at 2 hours compared to 0 hours (2-0 hours), suggests myocardial injury, and urgent clinical attention is required.    If the 2-0 hours increase is <7 ng/L, a High Sensitivity Troponin T result above gender-specific reference ranges warrants further evaluation.   Recommendations for further evaluation include correlation with clinical decision-making tool (e.g., HEART), a 3rd High Sensitivity Troponin T test 2 hours after the 2nd (a 20% change from baseline would represent concern), admission for observation, close PCC/cardiology follow-up, or urgent outpatient provocative testing.     Lipase 04/28/2023 44  13 - 60 U/L Final     Helicobacter pylori Antigen Stool 05/19/2023 Negative  Negative Final    Negative for Helicobacter pylori antigen by enzyme immunoassay. A negative result indicates the absence of H. pylori antigen or that the level of antigen is below the level of detection.

## 2023-06-16 NOTE — PROGRESS NOTES
Prior to immunization administration, verified patients identity using patient s name and date of birth. Please see Immunization Activity for additional information.     Screening Questionnaire for Adult Immunization    Are you sick today?   No   Do you have allergies to medications, food, a vaccine component or latex?   No   Have you ever had a serious reaction after receiving a vaccination?   No   Do you have a long-term health problem with heart, lung, kidney, or metabolic disease (e.g., diabetes), asthma, a blood disorder, no spleen, complement component deficiency, a cochlear implant, or a spinal fluid leak?  Are you on long-term aspirin therapy?   No   Do you have cancer, leukemia, HIV/AIDS, or any other immune system problem?   No   Do you have a parent, brother, or sister with an immune system problem?   No   In the past 3 months, have you taken medications that affect  your immune system, such as prednisone, other steroids, or anticancer drugs; drugs for the treatment of rheumatoid arthritis, Crohn s disease, or psoriasis; or have you had radiation treatments?   No   Have you had a seizure, or a brain or other nervous system problem?   No   During the past year, have you received a transfusion of blood or blood    products, or been given immune (gamma) globulin or antiviral drug?   No   For women: Are you pregnant or is there a chance you could become       pregnant during the next month?   No   Have you received any vaccinations in the past 4 weeks?   No     Immunization questionnaire answers were all negative. Administered TDAP.      Patient instructed to remain in clinic for 15 minutes afterwards, and to report any adverse reactions.     Screening performed by Aiyana Mendieta MA on 6/16/2023 at 11:25 AM.

## 2023-06-19 LAB
C PARVUM AG STL QL IA: NEGATIVE
G LAMBLIA AG STL QL IA: NEGATIVE

## 2023-06-20 ENCOUNTER — TELEPHONE (OUTPATIENT)
Dept: GASTROENTEROLOGY | Facility: CLINIC | Age: 38
End: 2023-06-20
Payer: COMMERCIAL

## 2023-06-20 NOTE — TELEPHONE ENCOUNTER
Health Call Center    Phone Message    May a detailed message be left on voicemail: Yes    Reason for Call: Other: Patient is currently scheduled on 8/22/23, as a patient New GI Urgent. This is outside the expected timeline for this schedule. Paitent has been added to the waitlist.      Pt was a no show for original appt and was rescheduled, pt requested Dr Townsend- writer unable to schedule within one month time frame    Action Taken: Message routed to:  Other: GI REFERRAL TRIAGE POOL     Travel Screening: Not Applicable

## 2023-06-23 NOTE — TELEPHONE ENCOUNTER
Writer called to help Pt get scheduled for a sooner appointment. Pt would like to see Dr. Rick Townsend only. Writer informed Pt that there are no sooner availability with Dr. Townsend. Pt was okay with keeping the appointment on 8/22/2023 at 11:20am with Dr. Townsend. Writer will be closing encounter now.

## 2023-07-05 DIAGNOSIS — K21.00 GASTROESOPHAGEAL REFLUX DISEASE WITH ESOPHAGITIS, UNSPECIFIED WHETHER HEMORRHAGE: ICD-10-CM

## 2023-07-05 DIAGNOSIS — K29.50 CHRONIC GASTRITIS WITHOUT BLEEDING, UNSPECIFIED GASTRITIS TYPE: ICD-10-CM

## 2023-07-05 RX ORDER — PANTOPRAZOLE SODIUM 40 MG/1
40 TABLET, DELAYED RELEASE ORAL DAILY
Qty: 30 TABLET | Refills: 1 | Status: SHIPPED | OUTPATIENT
Start: 2023-07-05 | End: 2024-07-22

## 2023-08-09 ENCOUNTER — MYC MEDICAL ADVICE (OUTPATIENT)
Dept: GASTROENTEROLOGY | Facility: CLINIC | Age: 38
End: 2023-08-09
Payer: COMMERCIAL

## 2023-08-14 NOTE — TELEPHONE ENCOUNTER
Called to remind patient of their upcoming appointment with our GI clinic, on Tuesday 8/22/2023 at 11:00AM with Dr. Rick Townsend. This appointment is scheduled as an in-person appt. Please arrive 15 minutes early to check in for your appointment. , if your appointment is virtual (video or telephone) you need to be in [place] for the visit. To reschedule or cancel appointment patient needs to call [phone] option 1.  To confirm appointment patient advised to call back.     Vi Torres MA

## 2023-08-22 ENCOUNTER — LAB (OUTPATIENT)
Dept: LAB | Facility: CLINIC | Age: 38
End: 2023-08-22
Payer: COMMERCIAL

## 2023-08-22 ENCOUNTER — OFFICE VISIT (OUTPATIENT)
Dept: GASTROENTEROLOGY | Facility: CLINIC | Age: 38
End: 2023-08-22
Attending: INTERNAL MEDICINE
Payer: COMMERCIAL

## 2023-08-22 VITALS
BODY MASS INDEX: 40.77 KG/M2 | OXYGEN SATURATION: 100 % | SYSTOLIC BLOOD PRESSURE: 148 MMHG | WEIGHT: 269 LBS | HEART RATE: 95 BPM | HEIGHT: 68 IN | DIASTOLIC BLOOD PRESSURE: 97 MMHG

## 2023-08-22 DIAGNOSIS — R10.84 ABDOMINAL PAIN, GENERALIZED: ICD-10-CM

## 2023-08-22 LAB — CRP SERPL-MCNC: <3 MG/L

## 2023-08-22 PROCEDURE — 99204 OFFICE O/P NEW MOD 45 MIN: CPT | Performed by: INTERNAL MEDICINE

## 2023-08-22 PROCEDURE — 86140 C-REACTIVE PROTEIN: CPT | Performed by: PATHOLOGY

## 2023-08-22 PROCEDURE — 99000 SPECIMEN HANDLING OFFICE-LAB: CPT | Performed by: PATHOLOGY

## 2023-08-22 PROCEDURE — 82784 ASSAY IGA/IGD/IGG/IGM EACH: CPT | Performed by: INTERNAL MEDICINE

## 2023-08-22 PROCEDURE — 86364 TISS TRNSGLTMNASE EA IG CLAS: CPT | Performed by: INTERNAL MEDICINE

## 2023-08-22 PROCEDURE — 36415 COLL VENOUS BLD VENIPUNCTURE: CPT | Performed by: PATHOLOGY

## 2023-08-22 ASSESSMENT — PAIN SCALES - GENERAL: PAINLEVEL: NO PAIN (0)

## 2023-08-22 NOTE — NURSING NOTE
"Chief Complaint   Patient presents with    New Patient       Vitals:    08/22/23 1140   BP: (!) 148/97   Pulse: 95   SpO2: 100%   Weight: 122 kg (269 lb)   Height: 1.727 m (5' 8\")       Body mass index is 40.9 kg/m .    [employer]    "

## 2023-08-22 NOTE — LETTER
8/22/2023         RE: Rory Melendrez  [address] [address]        Dear Colleague,    Thank you for referring your patient, Rory Melendrez, to the [facility] [place]. Please see a copy of my visit note below.    [facility]     Date: August 25, 2023    Rick Townsend MD  Associate Professor of Medicine  Division of Gastroenterology, Hepatology, and Nutrition  [facility]      Assessment: This is a 37-year-old from [facility] who has been having abdominal pain for several years in the upper abdomen.  He has been sent here for further evaluation.  He is currently treated for hypertension      Plan:   Recommend upper endoscopy and colonoscopy as well as CAT scan a few blood test to complete his work-up to include a TTG.  CRP.  I will follow-up with him after this.    Reason for Consult: Abdominal discomfort    Primary Care:  Aron Ramos    History of Present Illness:   37-year-old from [facility] who was recently at his doctor in May mentioning he has generalized upper abdominal pain sometimes up into his chest.  He is not sure what makes this better or worse that happens all the time it is happening all the time sometimes it is better sometimes is worse he does not know of any exacerbating or relieving factors.  He underwent several tests with his primary doctor    And see notes from him below    Abdominal pain, generalized    -  Primary      Relevant Orders     Cryptosporidium/Giardia Immunoassay     Adult GI  Referral - Consult Only     US Abdomen Complete     Chest pain, unspecified type         Relevant Orders     Exercise Stress Test - Adult     Class 2 obesity without serious comorbidity with body mass index (BMI) of 39.0 to 39.9 in adult, unspecified obesity type                 I am not sure about the cause of his symptoms- atypical chest pain atypical abdominal symptoms in the periumbilical area.   Describes being a bit gassy.  Advised can take probiotics, we will do an ultrasound of the abdomen and see GI for a consult can check  Giardia he does not have diarrhea.  [place]  He is getting tested today for H. Pylori; he submitted a stool sample.  As for the atypical chest symptoms, we will do a stress exercise test if that is negative probably his symptoms are related to GERD ,continue on pantoprazole, he had some improvement in his chest symptoms with starting a PPI.  Also patient has history of snoring he is not aware of any apnea or feeling tired or sleepy during the day.  Continue closely monitor.  Repeat blood pressure was within normal.  Strongly encouraged on exercise and weight loss at this point.We may refer him to [facility] To discuss  obesity medications including GLP-1 agonist injections.     Narrative & Impression   EXAM: US ABDOMEN COMPLETE  LOCATION: [facility]  DATE/TIME: 5/25/2023 10:05 AM CDT     INDICATION: Abdominal pain, generalized.  COMPARISON: None.  TECHNIQUE: Complete abdominal ultrasound.     FINDINGS:     GALLBLADDER: Normal. No gallstones, wall thickening, or pericholecystic fluid. Negative sonographic Graham's sign.     BILE DUCTS: No biliary dilatation. The common duct measures 4 mm.     LIVER: Normal parenchyma with smooth contour. No focal mass.     RIGHT KIDNEY: Normal size. Normal echogenicity with no hydronephrosis or mass.      LEFT KIDNEY: Normal size. Normal echogenicity with no hydronephrosis or mass.      SPLEEN: Normal.     PANCREAS: The visualized portions are normal.     AORTA: Normal in caliber.      IVC: Normal where visualized.     No ascites.                                                                      IMPRESSION:  1.  Normal complete abdominal ultrasound.       I re-corroborated this history with the patient.  Has had the symptoms for years the mid abdomen.  He also gets some chest pain.  He is not losing weight  "he is not having black or bloody stools he is not having nausea or vomiting    Most recent CBC:  Recent Labs   Lab Test 04/28/23  1405   WBC 5.8   HGB 16.5   HCT 47.1        Most recent hepatic panel:  Recent Labs   Lab Test 04/28/23  1405   ALT 38   AST 30     Most recent creatinine:  Recent Labs   Lab Test 04/28/23  1405   CR 1.02         Family History:  No family history of colitis or colon cancer    Medications:  Current Outpatient Medications   Medication Sig Dispense Refill    pantoprazole (PROTONIX) 40 MG EC tablet Take 1 tablet (40 mg) by mouth daily 30 tablet 1        Allergies:    No Known Allergies     Social History:       Employment:    Vital Signs:   BP (!) 148/97   Pulse 95   Ht 1.727 m (5' 8\")   Wt 122 kg (269 lb)   SpO2 100%   BMI 40.90 kg/m      Physical Exam:  General: No distress, breathing comfortably   Eyes: No icterus or injection  Heart: RRR no murmurs rubs or gallops  Lungs: CTA bilaterally   Abd: soft non tender bs+  - organomegaly   Ext: warm well perfused  Gait:  normal  MS: Alert oriented normal speech.   Psych: Normal affect   Skin: No jaundice or rash        Again, thank you for allowing me to participate in the care of your patient.      Sincerely,    Rick Townsend MD  "

## 2023-08-23 LAB
IGA SERPL-MCNC: 80 MG/DL (ref 84–499)
TTG IGA SER-ACNC: <0.2 U/ML
TTG IGG SER-ACNC: 1 U/ML

## 2023-08-25 NOTE — PROGRESS NOTES
[facility]     Date: August 25, 2023    Rick Townsend MD  Associate Professor of Medicine  Division of Gastroenterology, Hepatology, and Nutrition  [facility]      Assessment: This is a 37-year-old from [facility] who has been having abdominal pain for several years in the upper abdomen.  He has been sent here for further evaluation.  He is currently treated for hypertension      Plan:   Recommend upper endoscopy and colonoscopy as well as CAT scan a few blood test to complete his work-up to include a TTG.  CRP.  I will follow-up with him after this.    Reason for Consult: Abdominal discomfort    Primary Care:  Aron Ramos    History of Present Illness:   37-year-old from [facility] who was recently at his doctor in May mentioning he has generalized upper abdominal pain sometimes up into his chest.  He is not sure what makes this better or worse that happens all the time it is happening all the time sometimes it is better sometimes is worse he does not know of any exacerbating or relieving factors.  He underwent several tests with his primary doctor    And see notes from him below    Abdominal pain, generalized    -  Primary      Relevant Orders     Cryptosporidium/Giardia Immunoassay     Adult GI  Referral - Consult Only     US Abdomen Complete     Chest pain, unspecified type         Relevant Orders     Exercise Stress Test - Adult     Class 2 obesity without serious comorbidity with body mass index (BMI) of 39.0 to 39.9 in adult, unspecified obesity type                 I am not sure about the cause of his symptoms- atypical chest pain atypical abdominal symptoms in the periumbilical area.  Describes being a bit gassy.  Advised can take probiotics, we will do an ultrasound of the abdomen and see GI for a consult can check  Giardia he does not have diarrhea.  [place]  He is getting tested today for H. Pylori; he submitted a stool  sample.  As for the atypical chest symptoms, we will do a stress exercise test if that is negative probably his symptoms are related to GERD ,continue on pantoprazole, he had some improvement in his chest symptoms with starting a PPI.  Also patient has history of snoring he is not aware of any apnea or feeling tired or sleepy during the day.  Continue closely monitor.  Repeat blood pressure was within normal.  Strongly encouraged on exercise and weight loss at this point.We may refer him to MTM To discuss  obesity medications including GLP-1 agonist injections.     Narrative & Impression   EXAM: US ABDOMEN COMPLETE  LOCATION: [facility]  DATE/TIME: 5/25/2023 10:05 AM CDT     INDICATION: Abdominal pain, generalized.  COMPARISON: None.  TECHNIQUE: Complete abdominal ultrasound.     FINDINGS:     GALLBLADDER: Normal. No gallstones, wall thickening, or pericholecystic fluid. Negative sonographic Graham's sign.     BILE DUCTS: No biliary dilatation. The common duct measures 4 mm.     LIVER: Normal parenchyma with smooth contour. No focal mass.     RIGHT KIDNEY: Normal size. Normal echogenicity with no hydronephrosis or mass.      LEFT KIDNEY: Normal size. Normal echogenicity with no hydronephrosis or mass.      SPLEEN: Normal.     PANCREAS: The visualized portions are normal.     AORTA: Normal in caliber.      IVC: Normal where visualized.     No ascites.                                                                      IMPRESSION:  1.  Normal complete abdominal ultrasound.       I re-corroborated this history with the patient.  Has had the symptoms for years the mid abdomen.  He also gets some chest pain.  He is not losing weight he is not having black or bloody stools he is not having nausea or vomiting    Most recent CBC:  Recent Labs   Lab Test 04/28/23  1405   WBC 5.8   HGB 16.5   HCT 47.1        Most recent hepatic panel:  Recent Labs   Lab Test 04/28/23  1405   ALT 38   AST 30  "    Most recent creatinine:  Recent Labs   Lab Test 04/28/23  1405   CR 1.02         Family History:  No family history of colitis or colon cancer    Medications:  Current Outpatient Medications   Medication Sig Dispense Refill    pantoprazole (PROTONIX) 40 MG EC tablet Take 1 tablet (40 mg) by mouth daily 30 tablet 1        Allergies:    No Known Allergies     Social History:       Employment:    Vital Signs:   BP (!) 148/97   Pulse 95   Ht 1.727 m (5' 8\")   Wt 122 kg (269 lb)   SpO2 100%   BMI 40.90 kg/m      Physical Exam:  General: No distress, breathing comfortably   Eyes: No icterus or injection  Heart: RRR no murmurs rubs or gallops  Lungs: CTA bilaterally   Abd: soft non tender bs+  - organomegaly   Ext: warm well perfused  Gait:  normal  MS: Alert oriented normal speech.   Psych: Normal affect   Skin: No jaundice or rash      "

## 2023-09-01 ENCOUNTER — ANCILLARY PROCEDURE (OUTPATIENT)
Dept: CT IMAGING | Facility: CLINIC | Age: 38
End: 2023-09-01
Attending: INTERNAL MEDICINE
Payer: COMMERCIAL

## 2023-09-01 DIAGNOSIS — R10.84 ABDOMINAL PAIN, GENERALIZED: ICD-10-CM

## 2023-09-01 PROCEDURE — 250N000011 HC RX IP 250 OP 636: Performed by: INTERNAL MEDICINE

## 2023-09-01 PROCEDURE — 74177 CT ABD & PELVIS W/CONTRAST: CPT

## 2023-09-01 PROCEDURE — 250N000009 HC RX 250: Performed by: INTERNAL MEDICINE

## 2023-09-01 RX ORDER — IOPAMIDOL 755 MG/ML
90 INJECTION, SOLUTION INTRAVASCULAR ONCE
Status: COMPLETED | OUTPATIENT
Start: 2023-09-01 | End: 2023-09-01

## 2023-09-01 RX ADMIN — IOPAMIDOL 90 ML: 755 INJECTION, SOLUTION INTRAVENOUS at 11:29

## 2023-09-01 RX ADMIN — SODIUM CHLORIDE 45 ML: 9 INJECTION, SOLUTION INTRAVENOUS at 11:30

## 2023-09-13 ENCOUNTER — TELEPHONE (OUTPATIENT)
Dept: GASTROENTEROLOGY | Facility: CLINIC | Age: 38
End: 2023-09-13
Payer: COMMERCIAL

## 2023-09-19 ENCOUNTER — TELEPHONE (OUTPATIENT)
Dept: GASTROENTEROLOGY | Facility: CLINIC | Age: 38
End: 2023-09-19
Payer: COMMERCIAL

## 2023-09-19 NOTE — TELEPHONE ENCOUNTER
Contacted pt to schedule appointment with Dr. Townsend         Pt wanted me to callback in the am, informed him that he could contact us by calling the number.

## 2023-09-25 ENCOUNTER — HOSPITAL ENCOUNTER (OUTPATIENT)
Dept: CARDIOLOGY | Facility: CLINIC | Age: 38
Discharge: HOME | End: 2023-09-25
Attending: INTERNAL MEDICINE | Admitting: INTERNAL MEDICINE
Payer: COMMERCIAL

## 2023-09-25 DIAGNOSIS — R07.9 CHEST PAIN, UNSPECIFIED TYPE: ICD-10-CM

## 2023-09-25 PROCEDURE — 93017 CV STRESS TEST TRACING ONLY: CPT

## 2023-09-25 PROCEDURE — 93016 CV STRESS TEST SUPVJ ONLY: CPT | Performed by: INTERNAL MEDICINE

## 2023-09-25 PROCEDURE — 93018 CV STRESS TEST I&R ONLY: CPT | Performed by: INTERNAL MEDICINE

## 2023-10-02 ENCOUNTER — TELEPHONE (OUTPATIENT)
Dept: GASTROENTEROLOGY | Facility: CLINIC | Age: 38
End: 2023-10-02
Payer: COMMERCIAL

## 2023-10-02 ENCOUNTER — HOSPITAL ENCOUNTER (OUTPATIENT)
Facility: AMBULATORY SURGERY CENTER | Age: 38
End: 2023-10-02
Attending: INTERNAL MEDICINE
Payer: COMMERCIAL

## 2023-10-02 NOTE — TELEPHONE ENCOUNTER
"Endoscopy Scheduling Screen    Have you had a positive Covid test in the last 14 days?  No      Are you active on MyChart?   No      What insurance is in the chart?  Other:  [facility]      Ordering/Referring Provider: javier   (If ordering provider performs procedure, schedule with ordering provider unless otherwise instructed. )    BMI: Estimated body mass index is 40.9 kg/m  as calculated from the following:    Height as of 8/22/23: 1.727 m (5' 8\").    Weight as of 8/22/23: 122 kg (269 lb).     Sedation   Ordered  MAC/deep sedation.   BMI<= 45 45 < BMI <= 48 48 < BMI < = 50  BMI > 50   No Restrictions No [facility]  No [facility]  [facility] with exceptions Hospital Only OR Only         Are you taking any prescription medications for pain 3 or more times per week?   No      Do you have a history of malignant hyperthermia or adverse reaction to anesthesia?  No      (Females) Are you currently pregnant?   No       Have you been diagnosed or told you have pulmonary hypertension?   No      Do you have an LVAD?  No      Have you been told you have moderate to severe sleep apnea?  No      Have you been told you have COPD, asthma, or any other lung disease?  No      Do you have any heart conditions?  No       Have you ever had an organ transplant?   No      Have you ever had or are you awaiting a heart or lung transplant?   No    Have you had a stroke or transient ischemic attack (TIA aka \"mini stroke\" in the last 6 months?   No      Have you been diagnosed with or been told you have cirrhosis of the liver?   No      Are you currently on dialysis?   No      Do you need assistance transferring?   No    BMI: Estimated body mass index is 40.9 kg/m  as calculated from the following:    Height as of 8/22/23: 1.727 m (5' 8\").    Weight as of 8/22/23: 122 kg (269 lb).       Is patients BMI > 40 and scheduling location [facility]?  No      Do you take an injectable medication for weight loss or diabetes (excluding insulin)?  No      Do you take the " medication Naltrexone?  No      Do you take blood thinners?  No       Prep   Are you currently on dialysis or do you have chronic kidney disease?  No      Do you have a diagnosis of diabetes?  No      Do you have a diagnosis of cystic fibrosis (CF)?  No      On a regular basis do you go 3 -5 days between bowel movements?  NO    BMI > 40?  No      Preferred Pharmacy:    [id number] - [place], [place] - [address]  [address] [place] [address]  Phone: [phone] Fax: [phone]      Final Scheduling Details   Colonoscopy prep sent?  Golytely Extended Prep      Procedure scheduled  Colonoscopy / Upper endoscopy (EGD)      Surgeon:  MIKY NAQVI     Date of procedure:  12/29     Pre-OP / PAC:   No - Not required for this site.    Location  [facility] - ASC - Per order.    Sedation   MAC/Deep Sedation - Per order.      Patient Reminders:   You will receive a call from a Nurse to review instructions and health history.  This assessment must be completed prior to your procedure.  Failure to complete the Nurse assessment may result in the procedure being cancelled.      On the day of your procedure, please designate an adult(s) who can drive you home stay with you for the next 24 hours. The medicines used in the exam will make you sleepy. You will not be able to drive.      You cannot take public transportation, ride share services, or non-medical taxi service without a responsible caregiver.  Medical transport services are allowed with the requirement that a responsible caregiver will receive you at your destination.  We require that drivers and caregivers are confirmed prior to your procedure.

## 2023-11-08 ENCOUNTER — TELEPHONE (OUTPATIENT)
Dept: GASTROENTEROLOGY | Facility: CLINIC | Age: 38
End: 2023-11-08
Payer: COMMERCIAL

## 2023-11-08 NOTE — TELEPHONE ENCOUNTER
Caller:   Reason for Reschedule/Cancellation (please be detailed, any staff messages or encounters to note?): RESCHEDULE WISE      Prior to reschedule please review:  Ordering Provider:     MAKENNA NAQVI     Sedation per order: MAC  Does patient have any ASC Exclusions, please identify?:       Notes on Cancelled Procedure:  Procedure: Upper and Lower Endoscopy [EGD and Colonoscopy]   Date: 12/29  Location: [facility]; [address] [place] [address], [address] Floor, [place] [address]  Surgeon: DRISS      Rescheduled: YES  Procedure:  DOUBLE  Date: 3/1  Location: [facility]  Surgeon: WISE  Sedation Level Scheduled MAC ,  Reason for Sedation Level ORDER  Prep/Instructions updated and sent: Y       Send In - basket message to Panc - Mohinder Pool if EUS  procedure is canceled or rescheduled: [ N/A, YES or NO]

## 2023-11-13 ENCOUNTER — HOSPITAL ENCOUNTER (OUTPATIENT)
Facility: AMBULATORY SURGERY CENTER | Age: 38
End: 2023-11-13
Attending: INTERNAL MEDICINE
Payer: COMMERCIAL

## 2024-02-16 ENCOUNTER — TELEPHONE (OUTPATIENT)
Dept: GASTROENTEROLOGY | Facility: CLINIC | Age: 39
End: 2024-02-16
Payer: COMMERCIAL

## 2024-02-16 DIAGNOSIS — R10.84 ABDOMINAL PAIN, GENERALIZED: Primary | ICD-10-CM

## 2024-02-16 RX ORDER — BISACODYL 5 MG/1
TABLET, DELAYED RELEASE ORAL
Qty: 4 TABLET | Refills: 0 | Status: SHIPPED | OUTPATIENT
Start: 2024-02-16

## 2024-02-16 NOTE — TELEPHONE ENCOUNTER
Pre visit planning completed.      Procedure details:    Patient scheduled for Colonoscopy/Upper endoscopy (EGD) on 3/1/24.     Arrival time: 1300. Procedure time 1400    Pre op exam needed? N/A    Facility location: [facility]; [address] [place] [address], [address] Floor, [place] [address]    Sedation type: MAC    Indication for procedure:   Abdominal pain, generalized            Chart review:     Electronic implanted devices? No    Recent diagnosis of diverticulitis within the last 6 weeks? No    Diabetic? No    Diabetic medication HOLDING recommendations: (if applicable)  Oral diabetic medications: No  Diabetic injectables: No  Insulin: No      Medication review:    Anticoagulants? No    NSAIDS? No    Other medication HOLDING recommendations:  EGD: No holding needed for PPIs/Acid reducing medication.       Prep for procedure:     Bowel prep recommendation: Extended prep Golytely    Due to:  BMI > 40.     Prep instructions sent via [employer]     Bowel prep script sent to   [id number] - [place], [place] - [address] RN  Endoscopy Procedure Pre Assessment RN  [phone] option 4

## 2024-02-16 NOTE — TELEPHONE ENCOUNTER
Caller: Protus    Reason for Reschedule/Cancellation   (please be detailed, any staff messages or encounters to note?): cannot make date and wants later in year      Prior to reschedule please review:  Ordering Provider: MIKY Townsend  Sedation Determined: MAC  Does patient have any ASC Exclusions, please identify?: no      Notes on Cancelled Procedure:  Procedure: Upper and Lower Endoscopy [EGD and Colonoscopy]   Date: 3/1  Location: [facility]; [address] [place] [address], [address] [place] [address]  Surgeon: MIKY Townsend      Rescheduled: Yes    Procedure: Upper and Lower Endoscopy [EGD and Colonoscopy]   Date: 6/11  Location: [facility]; [address] [place] [address], [address] [place] [address]  Surgeon: Shawna (wise gone)  Sedation Level Scheduled  MAC,  Reason for Sedation Level order  Prep/Instructions updated and sent: y       Did you cancel or rescheduled an EUS procedure? No.

## 2024-02-16 NOTE — TELEPHONE ENCOUNTER
Called patient to [place] - patient states he needs to reschedule - patient was transferred to scheduling.     Alexa Paulson RN  Endoscopy Procedure Pre Assessment RN  [phone] option 4

## 2024-03-29 ENCOUNTER — PATIENT OUTREACH (OUTPATIENT)
Dept: CARE COORDINATION | Facility: CLINIC | Age: 39
End: 2024-03-29
Payer: COMMERCIAL

## 2024-04-12 ENCOUNTER — PATIENT OUTREACH (OUTPATIENT)
Dept: CARE COORDINATION | Facility: CLINIC | Age: 39
End: 2024-04-12
Payer: COMMERCIAL

## 2024-06-03 NOTE — TELEPHONE ENCOUNTER
Rescheduled procedure    Pre visit planning completed.      Procedure details:    Patient scheduled for Colonoscopy/Upper endoscopy (EGD) on 6.11.24.     Arrival time: 1230. Procedure time 1330    Facility location: [facility]; [address] [place] [address], [address] Floor, [place] [address]. Check in location: 5th Floor.    Sedation type: MAC    Pre op exam needed? N/A    Indication for procedure: Abdominal pain      Chart review:     Electronic implanted devices? No    Recent diagnosis of diverticulitis within the last 6 weeks? No    Diabetic? No      Medication review:    Anticoagulants? No    NSAIDS? No NSAID medications per patient's medication list.  RN will verify with pre-assessment call.    Other medication HOLDING recommendations:  N/A      Prep for procedure:     Bowel prep recommendation: Extended Golytely. Bowel prep prescription sent to [id number] - [place], [place] - [address] - inquire if patient has this from previously scheduled procedure  Due to: BMI > 40.     Prep instructions sent via ben Ng RN  Endoscopy Procedure Pre Assessment RN  [phone] option 4

## 2024-06-04 ENCOUNTER — TELEPHONE (OUTPATIENT)
Dept: GASTROENTEROLOGY | Facility: CLINIC | Age: 39
End: 2024-06-04
Payer: COMMERCIAL

## 2024-06-04 NOTE — TELEPHONE ENCOUNTER
Caller: Rory Melendrez   Reason for Reschedule/Cancellation (please be detailed, any staff messages or encounters to note?):     Per pt - can candace only with no r/s       Prior to reschedule please review:  Ordering Provider:Per pt - can candace only with no r/s   Sedation Determined: mac   Does patient have any ASC Exclusions, please identify?:  n       Notes on Cancelled Procedure:  Procedure:Lower Endoscopy [Colonoscopy]   Date: 06/11/2024  [facility]; [address] [place] [address], [address] Floor, [place] [address]  Surgeon: Shawna         Rescheduled: no

## 2024-06-04 NOTE — TELEPHONE ENCOUNTER
Pre assessment completed for upcoming procedure.   (Please see previous telephone encounter notes for complete details)    Procedure details:    Arrival time and facility location reviewed.    Pre op exam needed? N/A    Designated  policy reviewed. Instructed to have someone stay 24  hours post procedure.       Medication review:    Medications reviewed. Please see supporting documentation below. Holding recommendations discussed (if applicable).       Prep for procedure:     Procedure prep instructions reviewed.        Any additional information needed:  N/A      Patient  verbalized understanding and had no questions or concerns at this time.      Alexa Restrepo RN  Endoscopy Procedure Pre Assessment   [phone] option 4

## 2024-07-22 DIAGNOSIS — K21.00 GASTROESOPHAGEAL REFLUX DISEASE WITH ESOPHAGITIS, UNSPECIFIED WHETHER HEMORRHAGE: ICD-10-CM

## 2024-07-22 DIAGNOSIS — K29.50 CHRONIC GASTRITIS WITHOUT BLEEDING, UNSPECIFIED GASTRITIS TYPE: ICD-10-CM

## 2024-07-22 RX ORDER — PANTOPRAZOLE SODIUM 40 MG/1
40 TABLET, DELAYED RELEASE ORAL DAILY
Qty: 30 TABLET | Refills: 0 | Status: SHIPPED | OUTPATIENT
Start: 2024-07-22

## 2024-07-27 DIAGNOSIS — K29.50 CHRONIC GASTRITIS WITHOUT BLEEDING, UNSPECIFIED GASTRITIS TYPE: ICD-10-CM

## 2024-07-27 DIAGNOSIS — K21.00 GASTROESOPHAGEAL REFLUX DISEASE WITH ESOPHAGITIS, UNSPECIFIED WHETHER HEMORRHAGE: ICD-10-CM

## 2024-07-28 ENCOUNTER — HEALTH MAINTENANCE LETTER (OUTPATIENT)
Age: 39
End: 2024-07-28

## 2024-07-29 RX ORDER — PANTOPRAZOLE SODIUM 40 MG/1
40 TABLET, DELAYED RELEASE ORAL DAILY
Qty: 30 TABLET | Refills: 0 | OUTPATIENT
Start: 2024-07-29

## 2025-08-10 ENCOUNTER — HEALTH MAINTENANCE LETTER (OUTPATIENT)
Age: 40
End: 2025-08-10